# Patient Record
Sex: FEMALE | Race: WHITE | Employment: OTHER | URBAN - METROPOLITAN AREA
[De-identification: names, ages, dates, MRNs, and addresses within clinical notes are randomized per-mention and may not be internally consistent; named-entity substitution may affect disease eponyms.]

---

## 2017-11-22 ENCOUNTER — ESTABLISHED COMPREHENSIVE EXAM (OUTPATIENT)
Dept: URBAN - METROPOLITAN AREA CLINIC 39 | Facility: CLINIC | Age: 77
End: 2017-11-22

## 2017-11-22 DIAGNOSIS — H04.123: ICD-10-CM

## 2017-11-22 DIAGNOSIS — H26.493: ICD-10-CM

## 2017-11-22 DIAGNOSIS — Z96.1: ICD-10-CM

## 2017-11-22 DIAGNOSIS — H40.013: ICD-10-CM

## 2017-11-22 PROCEDURE — G8428 CUR MEDS NOT DOCUMENT: HCPCS

## 2017-11-22 PROCEDURE — 1036F TOBACCO NON-USER: CPT

## 2017-11-22 PROCEDURE — 92014 COMPRE OPH EXAM EST PT 1/>: CPT

## 2017-11-22 PROCEDURE — G8785 BP SCRN NO PERF AT INTERVAL: HCPCS

## 2017-11-22 ASSESSMENT — VISUAL ACUITY
OD_SC: J2
OS_SC: J4
OD_SC: 20/70
OD_PH: 20/30+2
OU_SC: 20/25
OU_SC: J1
OS_SC: 20/25

## 2017-11-22 ASSESSMENT — TONOMETRY
OD_IOP_MMHG: 12
OS_IOP_MMHG: 12

## 2017-12-13 ENCOUNTER — SURGERY/PROCEDURE (OUTPATIENT)
Dept: URBAN - METROPOLITAN AREA SURGERY 14 | Facility: SURGERY | Age: 77
End: 2017-12-13

## 2017-12-13 DIAGNOSIS — H26.491: ICD-10-CM

## 2017-12-13 PROCEDURE — 66821 AFTER CATARACT LASER SURGERY: CPT

## 2018-01-10 ENCOUNTER — SURGERY/PROCEDURE (OUTPATIENT)
Dept: URBAN - METROPOLITAN AREA SURGERY 14 | Facility: SURGERY | Age: 78
End: 2018-01-10

## 2018-01-10 DIAGNOSIS — H26.492: ICD-10-CM

## 2018-01-10 PROCEDURE — 66821 AFTER CATARACT LASER SURGERY: CPT

## 2018-01-17 ENCOUNTER — CONSULT (OUTPATIENT)
Dept: URBAN - METROPOLITAN AREA CLINIC 39 | Facility: CLINIC | Age: 78
End: 2018-01-17

## 2018-01-17 ENCOUNTER — POST-OP (OUTPATIENT)
Dept: URBAN - METROPOLITAN AREA CLINIC 39 | Facility: CLINIC | Age: 78
End: 2018-01-17

## 2018-01-17 DIAGNOSIS — Z98.890: ICD-10-CM

## 2018-01-17 DIAGNOSIS — L92.1: ICD-10-CM

## 2018-01-17 PROCEDURE — 99024 POSTOP FOLLOW-UP VISIT: CPT

## 2018-01-17 PROCEDURE — 99499 UNLISTED E&M SERVICE: CPT

## 2018-01-17 ASSESSMENT — VISUAL ACUITY
OS_SC: 20/30-2
OS_SC: J3
OU_SC: J1
OU_SC: 20/25-2
OD_SC: 20/25
OD_SC: J2
OD_SC: 20/50-1
OS_SC: 20/25

## 2018-01-17 ASSESSMENT — TONOMETRY
OD_IOP_MMHG: 20
OS_IOP_MMHG: 18

## 2018-11-15 ENCOUNTER — ESTABLISHED COMPREHENSIVE EXAM (OUTPATIENT)
Dept: URBAN - METROPOLITAN AREA CLINIC 39 | Facility: CLINIC | Age: 78
End: 2018-11-15

## 2018-11-15 DIAGNOSIS — Z96.1: ICD-10-CM

## 2018-11-15 DIAGNOSIS — H04.123: ICD-10-CM

## 2018-11-15 DIAGNOSIS — H16.223: ICD-10-CM

## 2018-11-15 DIAGNOSIS — Z98.890: ICD-10-CM

## 2018-11-15 DIAGNOSIS — H40.013: ICD-10-CM

## 2018-11-15 PROCEDURE — G9903 PT SCRN TBCO ID AS NON USER: HCPCS

## 2018-11-15 PROCEDURE — 92014 COMPRE OPH EXAM EST PT 1/>: CPT

## 2018-11-15 PROCEDURE — 1036F TOBACCO NON-USER: CPT

## 2018-11-15 PROCEDURE — G8427 DOCREV CUR MEDS BY ELIG CLIN: HCPCS

## 2018-11-15 PROCEDURE — 92015 DETERMINE REFRACTIVE STATE: CPT

## 2018-11-15 ASSESSMENT — VISUAL ACUITY
OS_SC: 20/25-1
OD_SC: 20/40+1
OD_SC: J3
OS_SC: J6
OS_CC: J1
OD_CC: J1

## 2018-11-15 ASSESSMENT — TONOMETRY
OS_IOP_MMHG: 21
OD_IOP_MMHG: 18

## 2018-12-19 ENCOUNTER — EST. PATIENT EMERGENCY (OUTPATIENT)
Dept: URBAN - METROPOLITAN AREA CLINIC 39 | Facility: CLINIC | Age: 78
End: 2018-12-19

## 2018-12-19 DIAGNOSIS — B02.39: ICD-10-CM

## 2018-12-19 DIAGNOSIS — H16.223: ICD-10-CM

## 2018-12-19 DIAGNOSIS — H11.441: ICD-10-CM

## 2018-12-19 DIAGNOSIS — Z98.890: ICD-10-CM

## 2018-12-19 PROCEDURE — 1036F TOBACCO NON-USER: CPT

## 2018-12-19 PROCEDURE — G9903 PT SCRN TBCO ID AS NON USER: HCPCS

## 2018-12-19 PROCEDURE — G8428 CUR MEDS NOT DOCUMENT: HCPCS

## 2018-12-19 PROCEDURE — 92012 INTRM OPH EXAM EST PATIENT: CPT

## 2018-12-19 PROCEDURE — G8785 BP SCRN NO PERF AT INTERVAL: HCPCS

## 2018-12-19 RX ORDER — MINERAL OIL 2; 2 MG/.4ML; MG/.4ML: 1 EMULSION OPHTHALMIC

## 2018-12-19 RX ORDER — TOBRAMYCIN AND DEXAMETHASONE 1; 3 MG/ML; MG/ML: 1 SUSPENSION/ DROPS OPHTHALMIC

## 2018-12-19 RX ORDER — BUSPIRONE HYDROCHLORIDE 10 MG/1: 1 TABLET ORAL

## 2018-12-19 ASSESSMENT — TONOMETRY
OS_IOP_MMHG: 13
OD_IOP_MMHG: 13

## 2018-12-19 ASSESSMENT — VISUAL ACUITY
OD_SC: 20/30+2
OS_SC: J2
OS_SC: 20/20-2
OD_SC: J3

## 2019-05-09 ENCOUNTER — ESTABLISHED COMPREHENSIVE EXAM (OUTPATIENT)
Dept: URBAN - METROPOLITAN AREA CLINIC 39 | Facility: CLINIC | Age: 79
End: 2019-05-09

## 2019-05-09 DIAGNOSIS — H16.223: ICD-10-CM

## 2019-05-09 DIAGNOSIS — H43.813: ICD-10-CM

## 2019-05-09 DIAGNOSIS — B02.39: ICD-10-CM

## 2019-05-09 DIAGNOSIS — H40.013: ICD-10-CM

## 2019-05-09 PROCEDURE — 92015 DETERMINE REFRACTIVE STATE: CPT

## 2019-05-09 PROCEDURE — 92133 CPTRZD OPH DX IMG PST SGM ON: CPT

## 2019-05-09 PROCEDURE — 92014 COMPRE OPH EXAM EST PT 1/>: CPT

## 2019-05-09 ASSESSMENT — VISUAL ACUITY
OS_SC: 20/30+1
OU_SC: J2
OU_SC: 20/25-2
OD_SC: J2
OS_SC: J2
OD_SC: 20/40+1

## 2019-05-09 ASSESSMENT — TONOMETRY
OD_IOP_MMHG: 12
OS_IOP_MMHG: 16

## 2020-02-25 ENCOUNTER — ESTABLISHED COMPREHENSIVE EXAM (OUTPATIENT)
Dept: URBAN - METROPOLITAN AREA CLINIC 38 | Facility: CLINIC | Age: 80
End: 2020-02-25

## 2020-02-25 DIAGNOSIS — B02.39: ICD-10-CM

## 2020-02-25 DIAGNOSIS — H52.02: ICD-10-CM

## 2020-02-25 DIAGNOSIS — H40.013: ICD-10-CM

## 2020-02-25 DIAGNOSIS — H02.88B: ICD-10-CM

## 2020-02-25 DIAGNOSIS — H02.834: ICD-10-CM

## 2020-02-25 DIAGNOSIS — H02.88A: ICD-10-CM

## 2020-02-25 DIAGNOSIS — H02.831: ICD-10-CM

## 2020-02-25 DIAGNOSIS — H43.813: ICD-10-CM

## 2020-02-25 DIAGNOSIS — H16.223: ICD-10-CM

## 2020-02-25 PROCEDURE — 92014 COMPRE OPH EXAM EST PT 1/>: CPT

## 2020-02-25 PROCEDURE — 92015 DETERMINE REFRACTIVE STATE: CPT

## 2020-02-25 PROCEDURE — A4263 PERMANENT TEAR DUCT PLUG: HCPCS

## 2020-02-25 PROCEDURE — 68761S PUNCTUM PLUG / SILICONE,EACH

## 2020-02-25 RX ORDER — MINERAL OIL, PETROLATUM 425; 573 MG/G; MG/G: OINTMENT OPHTHALMIC EVERY EVENING

## 2020-02-25 ASSESSMENT — VISUAL ACUITY
OD_SC: 20/25
OS_SC: J12
OD_SC: J6
OS_SC: 20/25-2

## 2020-02-25 ASSESSMENT — TONOMETRY
OD_IOP_MMHG: 16
OS_IOP_MMHG: 18

## 2020-05-18 ENCOUNTER — FOLLOW UP (OUTPATIENT)
Dept: URBAN - METROPOLITAN AREA CLINIC 39 | Facility: CLINIC | Age: 80
End: 2020-05-18

## 2020-05-18 DIAGNOSIS — H02.88B: ICD-10-CM

## 2020-05-18 DIAGNOSIS — H01.02A: ICD-10-CM

## 2020-05-18 DIAGNOSIS — H16.223: ICD-10-CM

## 2020-05-18 DIAGNOSIS — H01.02B: ICD-10-CM

## 2020-05-18 DIAGNOSIS — H02.88A: ICD-10-CM

## 2020-05-18 PROCEDURE — 92012 INTRM OPH EXAM EST PATIENT: CPT

## 2020-05-18 RX ORDER — OLOPATADINE HYDROCHLORIDE 2 MG/ML: 1 SOLUTION OPHTHALMIC EVERY MORNING

## 2020-05-18 RX ORDER — TOBRAMYCIN AND DEXAMETHASONE 1; 3 MG/ML; MG/ML: 1 SUSPENSION/ DROPS OPHTHALMIC

## 2020-05-18 ASSESSMENT — VISUAL ACUITY
OU_SC: 20/25
OD_SC: 20/30
OS_SC: 20/25-1

## 2020-05-18 ASSESSMENT — TONOMETRY
OD_IOP_MMHG: 16
OS_IOP_MMHG: 17

## 2020-06-02 ENCOUNTER — FOLLOW UP (OUTPATIENT)
Dept: URBAN - METROPOLITAN AREA CLINIC 38 | Facility: CLINIC | Age: 80
End: 2020-06-02

## 2020-06-02 DIAGNOSIS — H02.831: ICD-10-CM

## 2020-06-02 DIAGNOSIS — H16.223: ICD-10-CM

## 2020-06-02 DIAGNOSIS — H01.02A: ICD-10-CM

## 2020-06-02 DIAGNOSIS — H02.88A: ICD-10-CM

## 2020-06-02 DIAGNOSIS — H01.02B: ICD-10-CM

## 2020-06-02 DIAGNOSIS — H02.834: ICD-10-CM

## 2020-06-02 DIAGNOSIS — H02.88B: ICD-10-CM

## 2020-06-02 PROCEDURE — 92012 INTRM OPH EXAM EST PATIENT: CPT

## 2020-06-02 ASSESSMENT — VISUAL ACUITY
OD_SC: 20/25+2
OD_SC: J1
OU_SC: J2
OU_SC: 20/20
OS_SC: J5
OS_SC: 20/20

## 2020-06-02 ASSESSMENT — TONOMETRY
OS_IOP_MMHG: 15
OD_IOP_MMHG: 14

## 2020-06-24 ENCOUNTER — CONSULT (OUTPATIENT)
Dept: URBAN - METROPOLITAN AREA CLINIC 39 | Facility: CLINIC | Age: 80
End: 2020-06-24

## 2020-06-24 DIAGNOSIS — H02.834: ICD-10-CM

## 2020-06-24 DIAGNOSIS — H02.835: ICD-10-CM

## 2020-06-24 DIAGNOSIS — H02.832: ICD-10-CM

## 2020-06-24 DIAGNOSIS — H02.831: ICD-10-CM

## 2020-06-24 PROCEDURE — 92285 EXTERNAL OCULAR PHOTOGRAPHY: CPT

## 2020-06-24 PROCEDURE — 99213 OFFICE O/P EST LOW 20 MIN: CPT

## 2020-06-24 ASSESSMENT — VISUAL ACUITY
OD_SC: 20/40-2
OS_SC: 20/25

## 2020-06-29 ENCOUNTER — VISUAL FIELD ONLY (OUTPATIENT)
Dept: URBAN - METROPOLITAN AREA CLINIC 39 | Facility: CLINIC | Age: 80
End: 2020-06-29

## 2020-06-29 DIAGNOSIS — H02.831: ICD-10-CM

## 2020-06-29 DIAGNOSIS — H02.834: ICD-10-CM

## 2020-06-29 PROCEDURE — 99211T TECH SERVICE

## 2020-06-29 PROCEDURE — 92082 INTERMEDIATE VISUAL FIELD XM: CPT

## 2020-07-14 ENCOUNTER — PREPPED CHART (OUTPATIENT)
Dept: URBAN - METROPOLITAN AREA CLINIC 38 | Facility: CLINIC | Age: 80
End: 2020-07-14

## 2020-11-18 ENCOUNTER — PRE-OP/H&P (OUTPATIENT)
Dept: URBAN - METROPOLITAN AREA CLINIC 39 | Facility: CLINIC | Age: 80
End: 2020-11-18

## 2020-11-18 DIAGNOSIS — H16.223: ICD-10-CM

## 2020-11-18 DIAGNOSIS — H02.832: ICD-10-CM

## 2020-11-18 DIAGNOSIS — H01.02B: ICD-10-CM

## 2020-11-18 DIAGNOSIS — H02.834: ICD-10-CM

## 2020-11-18 DIAGNOSIS — H01.02A: ICD-10-CM

## 2020-11-18 DIAGNOSIS — L98.8: ICD-10-CM

## 2020-11-18 DIAGNOSIS — H02.835: ICD-10-CM

## 2020-11-18 DIAGNOSIS — H02.831: ICD-10-CM

## 2020-11-18 PROCEDURE — 99211HP H&P OFFICE/OUTPATIENT VISIT, EST

## 2020-11-18 RX ORDER — ERYTHROMYCIN 5 MG/G
OINTMENT OPHTHALMIC
Start: 2020-12-07

## 2020-11-18 RX ORDER — TOBRAMYCIN AND DEXAMETHASONE 1; 3 MG/ML; MG/ML
1 SUSPENSION/ DROPS OPHTHALMIC
Start: 2020-12-07

## 2020-12-15 ENCOUNTER — SURGERY/PROCEDURE (OUTPATIENT)
Dept: URBAN - METROPOLITAN AREA SURGERY 14 | Facility: SURGERY | Age: 80
End: 2020-12-15

## 2020-12-15 ENCOUNTER — PRE-OP/H&P (OUTPATIENT)
Dept: URBAN - METROPOLITAN AREA SURGERY 14 | Facility: SURGERY | Age: 80
End: 2020-12-15

## 2020-12-15 DIAGNOSIS — H02.834: ICD-10-CM

## 2020-12-15 DIAGNOSIS — H02.831: ICD-10-CM

## 2020-12-15 PROCEDURE — 1582350 UPPER BLEPH PER EYE FUNCTIONAL-BILATERAL

## 2020-12-15 PROCEDURE — 99499 UNLISTED E&M SERVICE: CPT

## 2020-12-22 ENCOUNTER — POST-OP (OUTPATIENT)
Dept: URBAN - METROPOLITAN AREA CLINIC 39 | Facility: CLINIC | Age: 80
End: 2020-12-22

## 2020-12-22 DIAGNOSIS — Z98.890: ICD-10-CM

## 2020-12-22 PROCEDURE — 99024 POSTOP FOLLOW-UP VISIT: CPT

## 2020-12-22 ASSESSMENT — VISUAL ACUITY
OS_SC: 20/40
OD_SC: 20/40-1

## 2021-02-02 NOTE — PATIENT DISCUSSION
PT IS A CANDIDATE FOR LASIK; HOWEVER UNDERSTANDS SHE WILL HAVE TO D/C SUMATRIPTAN FOR 6 MONTHS PRIOR TO SURGERY AND STAY OFF FOR 2 MONTHS POST SURGERY. RTC PRN FOR REPEAT CONSULT.

## 2021-11-04 ENCOUNTER — EST. PATIENT EMERGENCY (OUTPATIENT)
Dept: URBAN - METROPOLITAN AREA CLINIC 38 | Facility: CLINIC | Age: 81
End: 2021-11-04

## 2021-11-04 DIAGNOSIS — H16.223: ICD-10-CM

## 2021-11-04 DIAGNOSIS — H01.02B: ICD-10-CM

## 2021-11-04 DIAGNOSIS — H01.02A: ICD-10-CM

## 2021-11-04 DIAGNOSIS — H10.45: ICD-10-CM

## 2021-11-04 PROCEDURE — 92012 INTRM OPH EXAM EST PATIENT: CPT

## 2021-11-04 RX ORDER — NEOMYCIN SULFATE, POLYMYXIN B SULFATE AND DEXAMETHASONE 3.5; 10000; 1 MG/ML; [USP'U]/ML; MG/ML: 1 SUSPENSION OPHTHALMIC

## 2021-11-04 ASSESSMENT — TONOMETRY
OD_IOP_MMHG: 16
OS_IOP_MMHG: 16

## 2021-11-04 ASSESSMENT — VISUAL ACUITY
OD_SC: 20/30
OS_SC: 20/20-2

## 2022-05-09 ENCOUNTER — COMPREHENSIVE EXAM (OUTPATIENT)
Dept: URBAN - METROPOLITAN AREA CLINIC 38 | Facility: CLINIC | Age: 82
End: 2022-05-09

## 2022-05-09 DIAGNOSIS — H01.02A: ICD-10-CM

## 2022-05-09 DIAGNOSIS — H10.45: ICD-10-CM

## 2022-05-09 DIAGNOSIS — H43.813: ICD-10-CM

## 2022-05-09 DIAGNOSIS — H01.02B: ICD-10-CM

## 2022-05-09 DIAGNOSIS — H16.223: ICD-10-CM

## 2022-05-09 DIAGNOSIS — H40.013: ICD-10-CM

## 2022-05-09 DIAGNOSIS — H52.7: ICD-10-CM

## 2022-05-09 PROCEDURE — 92014 COMPRE OPH EXAM EST PT 1/>: CPT

## 2022-05-09 PROCEDURE — 92015 DETERMINE REFRACTIVE STATE: CPT

## 2022-05-09 ASSESSMENT — VISUAL ACUITY
OS_SC: J12
OS_SC: 20/25-1
OD_SC: J12
OD_SC: 20/20

## 2022-05-09 ASSESSMENT — TONOMETRY
OD_IOP_MMHG: 15
OS_IOP_MMHG: 16

## 2022-10-17 ENCOUNTER — EMERGENCY VISIT (OUTPATIENT)
Dept: URBAN - METROPOLITAN AREA CLINIC 38 | Facility: CLINIC | Age: 82
End: 2022-10-17

## 2022-10-17 DIAGNOSIS — H16.223: ICD-10-CM

## 2022-10-17 DIAGNOSIS — H01.02A: ICD-10-CM

## 2022-10-17 DIAGNOSIS — H02.831: ICD-10-CM

## 2022-10-17 DIAGNOSIS — H01.02B: ICD-10-CM

## 2022-10-17 DIAGNOSIS — H02.834: ICD-10-CM

## 2022-10-17 DIAGNOSIS — H10.45: ICD-10-CM

## 2022-10-17 PROCEDURE — 92012 INTRM OPH EXAM EST PATIENT: CPT

## 2022-10-17 PROCEDURE — A4262 TEMPORARY TEAR DUCT PLUG: HCPCS

## 2022-10-17 PROCEDURE — 68761Q PUNCTAL PLUG/QUINTESS DISSOLVABLE PLUG/EACH

## 2022-10-17 RX ORDER — TOBRAMYCIN AND DEXAMETHASONE 1; 3 MG/ML; MG/ML: 1 SUSPENSION/ DROPS OPHTHALMIC

## 2022-10-17 ASSESSMENT — VISUAL ACUITY
OD_SC: 20/20-1
OS_SC: 20/25

## 2022-10-17 ASSESSMENT — TONOMETRY
OS_IOP_MMHG: 18
OD_IOP_MMHG: 16

## 2022-10-26 ENCOUNTER — FOLLOW UP (OUTPATIENT)
Dept: URBAN - METROPOLITAN AREA CLINIC 38 | Facility: CLINIC | Age: 82
End: 2022-10-26

## 2022-10-26 DIAGNOSIS — H02.88B: ICD-10-CM

## 2022-10-26 DIAGNOSIS — H01.02A: ICD-10-CM

## 2022-10-26 DIAGNOSIS — H40.013: ICD-10-CM

## 2022-10-26 DIAGNOSIS — H01.02B: ICD-10-CM

## 2022-10-26 DIAGNOSIS — H02.88A: ICD-10-CM

## 2022-10-26 DIAGNOSIS — H16.223: ICD-10-CM

## 2022-10-26 DIAGNOSIS — H10.45: ICD-10-CM

## 2022-10-26 PROCEDURE — 92083 EXTENDED VISUAL FIELD XM: CPT

## 2022-10-26 PROCEDURE — 92012 INTRM OPH EXAM EST PATIENT: CPT

## 2022-10-26 ASSESSMENT — TONOMETRY
OD_IOP_MMHG: 18
OS_IOP_MMHG: 18

## 2022-10-26 ASSESSMENT — VISUAL ACUITY
OD_SC: 20/25+2
OS_SC: 20/30+2

## 2022-11-30 ENCOUNTER — FOLLOW UP (OUTPATIENT)
Dept: URBAN - METROPOLITAN AREA CLINIC 38 | Facility: CLINIC | Age: 82
End: 2022-11-30

## 2022-11-30 DIAGNOSIS — H01.02A: ICD-10-CM

## 2022-11-30 DIAGNOSIS — H10.45: ICD-10-CM

## 2022-11-30 DIAGNOSIS — H01.02B: ICD-10-CM

## 2022-11-30 DIAGNOSIS — H16.223: ICD-10-CM

## 2022-11-30 DIAGNOSIS — H02.88B: ICD-10-CM

## 2022-11-30 DIAGNOSIS — H02.831: ICD-10-CM

## 2022-11-30 DIAGNOSIS — H02.88A: ICD-10-CM

## 2022-11-30 DIAGNOSIS — H02.834: ICD-10-CM

## 2022-11-30 PROCEDURE — 92012 INTRM OPH EXAM EST PATIENT: CPT

## 2022-11-30 RX ORDER — LOTEPREDNOL ETABONATE 2 MG/ML: 1 SUSPENSION/ DROPS OPHTHALMIC TWICE A DAY

## 2022-11-30 ASSESSMENT — TONOMETRY
OS_IOP_MMHG: 17
OD_IOP_MMHG: 17

## 2022-11-30 ASSESSMENT — VISUAL ACUITY
OS_SC: 20/30-1
OU_SC: 20/25
OD_SC: 20/25-2

## 2023-05-31 ENCOUNTER — COMPREHENSIVE EXAM (OUTPATIENT)
Dept: URBAN - METROPOLITAN AREA CLINIC 38 | Facility: CLINIC | Age: 83
End: 2023-05-31

## 2023-05-31 DIAGNOSIS — H52.4: ICD-10-CM

## 2023-05-31 DIAGNOSIS — H02.835: ICD-10-CM

## 2023-05-31 DIAGNOSIS — H02.88B: ICD-10-CM

## 2023-05-31 DIAGNOSIS — H02.832: ICD-10-CM

## 2023-05-31 DIAGNOSIS — H52.03: ICD-10-CM

## 2023-05-31 DIAGNOSIS — H02.831: ICD-10-CM

## 2023-05-31 DIAGNOSIS — H52.203: ICD-10-CM

## 2023-05-31 DIAGNOSIS — H01.02A: ICD-10-CM

## 2023-05-31 DIAGNOSIS — H40.013: ICD-10-CM

## 2023-05-31 DIAGNOSIS — H16.223: ICD-10-CM

## 2023-05-31 DIAGNOSIS — H01.02B: ICD-10-CM

## 2023-05-31 DIAGNOSIS — H10.45: ICD-10-CM

## 2023-05-31 DIAGNOSIS — H02.834: ICD-10-CM

## 2023-05-31 DIAGNOSIS — H02.88A: ICD-10-CM

## 2023-05-31 DIAGNOSIS — L98.8: ICD-10-CM

## 2023-05-31 PROCEDURE — 68761Q PUNCTAL PLUG/QUINTESS DISSOLVABLE PLUG/EACH

## 2023-05-31 PROCEDURE — A4262 TEMPORARY TEAR DUCT PLUG: HCPCS

## 2023-05-31 PROCEDURE — 92133 CPTRZD OPH DX IMG PST SGM ON: CPT

## 2023-05-31 PROCEDURE — 92015 DETERMINE REFRACTIVE STATE: CPT

## 2023-05-31 PROCEDURE — 92014 COMPRE OPH EXAM EST PT 1/>: CPT

## 2023-05-31 RX ORDER — LIFITEGRAST 50 MG/ML: 1 SOLUTION/ DROPS OPHTHALMIC TWICE A DAY

## 2023-05-31 ASSESSMENT — TONOMETRY
OD_IOP_MMHG: 16
OS_IOP_MMHG: 16

## 2023-05-31 ASSESSMENT — VISUAL ACUITY
OD_SC: J6
OS_SC: J8
OS_SC: 20/20-2
OD_SC: 20/20

## 2023-10-12 ENCOUNTER — ESTABLISHED PATIENT (OUTPATIENT)
Dept: URBAN - METROPOLITAN AREA CLINIC 38 | Facility: CLINIC | Age: 83
End: 2023-10-12

## 2023-10-12 DIAGNOSIS — H52.203: ICD-10-CM

## 2023-10-12 DIAGNOSIS — H52.4: ICD-10-CM

## 2023-10-12 DIAGNOSIS — H01.02A: ICD-10-CM

## 2023-10-12 DIAGNOSIS — H02.88A: ICD-10-CM

## 2023-10-12 DIAGNOSIS — H52.03: ICD-10-CM

## 2023-10-12 DIAGNOSIS — H40.013: ICD-10-CM

## 2023-10-12 DIAGNOSIS — H16.223: ICD-10-CM

## 2023-10-12 DIAGNOSIS — R51.9: ICD-10-CM

## 2023-10-12 DIAGNOSIS — H01.02B: ICD-10-CM

## 2023-10-12 DIAGNOSIS — H02.88B: ICD-10-CM

## 2023-10-12 PROCEDURE — 68761Q PUNCTAL PLUG/QUINTESS DISSOLVABLE PLUG/EACH: Mod: E2,E4

## 2023-10-12 PROCEDURE — A4262 TEMPORARY TEAR DUCT PLUG: HCPCS | Mod: E2,E4

## 2023-10-12 PROCEDURE — 92015 DETERMINE REFRACTIVE STATE: CPT

## 2023-10-12 PROCEDURE — 92012 INTRM OPH EXAM EST PATIENT: CPT

## 2023-10-12 ASSESSMENT — VISUAL ACUITY
OS_SC: 20/25+2
OD_SC: 20/20-2
OU_SC: 20/20

## 2023-10-12 ASSESSMENT — TONOMETRY
OS_IOP_MMHG: 16
OD_IOP_MMHG: 15

## 2023-12-22 ENCOUNTER — FOLLOW UP (OUTPATIENT)
Dept: URBAN - METROPOLITAN AREA CLINIC 38 | Facility: CLINIC | Age: 83
End: 2023-12-22

## 2023-12-22 DIAGNOSIS — H40.013: ICD-10-CM

## 2023-12-22 DIAGNOSIS — H02.88B: ICD-10-CM

## 2023-12-22 DIAGNOSIS — H01.02A: ICD-10-CM

## 2023-12-22 DIAGNOSIS — H02.88A: ICD-10-CM

## 2023-12-22 DIAGNOSIS — H16.223: ICD-10-CM

## 2023-12-22 DIAGNOSIS — H01.02B: ICD-10-CM

## 2023-12-22 PROCEDURE — 99214 OFFICE O/P EST MOD 30 MIN: CPT

## 2023-12-22 RX ORDER — LOTEPREDNOL ETABONATE 5 MG/ML: 1 SUSPENSION/ DROPS OPHTHALMIC TWICE A DAY

## 2023-12-22 ASSESSMENT — VISUAL ACUITY
OU_SC: 20/20
OD_SC: 20/20
OS_SC: 20/25

## 2023-12-22 ASSESSMENT — TONOMETRY
OS_IOP_MMHG: 21
OD_IOP_MMHG: 19

## 2024-04-17 ENCOUNTER — PREPPED CHART (OUTPATIENT)
Dept: URBAN - METROPOLITAN AREA CLINIC 38 | Facility: CLINIC | Age: 84
End: 2024-04-17

## 2024-12-11 ENCOUNTER — EMERGENCY VISIT (OUTPATIENT)
Age: 84
End: 2024-12-11

## 2024-12-11 DIAGNOSIS — H01.02A: ICD-10-CM

## 2024-12-11 DIAGNOSIS — H02.88B: ICD-10-CM

## 2024-12-11 DIAGNOSIS — H02.88A: ICD-10-CM

## 2024-12-11 DIAGNOSIS — H00.025: ICD-10-CM

## 2024-12-11 DIAGNOSIS — H01.02B: ICD-10-CM

## 2024-12-11 PROCEDURE — 99214 OFFICE O/P EST MOD 30 MIN: CPT

## 2024-12-11 RX ORDER — NEOMYCIN SULFATE, POLYMYXIN B SULFATE AND DEXAMETHASONE 3.5; 10000; 1 MG/G; [USP'U]/G; MG/G: OINTMENT OPHTHALMIC TWICE A DAY

## 2025-02-26 ENCOUNTER — COMPREHENSIVE EXAM (OUTPATIENT)
Age: 85
End: 2025-02-26

## 2025-02-26 DIAGNOSIS — H40.013: ICD-10-CM

## 2025-02-26 DIAGNOSIS — H52.4: ICD-10-CM

## 2025-02-26 DIAGNOSIS — H52.03: ICD-10-CM

## 2025-02-26 DIAGNOSIS — H52.203: ICD-10-CM

## 2025-02-26 DIAGNOSIS — H02.88B: ICD-10-CM

## 2025-02-26 DIAGNOSIS — H43.813: ICD-10-CM

## 2025-02-26 DIAGNOSIS — H01.02A: ICD-10-CM

## 2025-02-26 DIAGNOSIS — H16.223: ICD-10-CM

## 2025-02-26 DIAGNOSIS — H02.88A: ICD-10-CM

## 2025-02-26 DIAGNOSIS — H01.02B: ICD-10-CM

## 2025-02-26 PROCEDURE — 92014 COMPRE OPH EXAM EST PT 1/>: CPT

## 2025-02-26 PROCEDURE — 92015 DETERMINE REFRACTIVE STATE: CPT
